# Patient Record
Sex: FEMALE | Race: BLACK OR AFRICAN AMERICAN
[De-identification: names, ages, dates, MRNs, and addresses within clinical notes are randomized per-mention and may not be internally consistent; named-entity substitution may affect disease eponyms.]

---

## 2019-08-29 ENCOUNTER — HOSPITAL ENCOUNTER (OUTPATIENT)
Dept: HOSPITAL 35 - CAT | Age: 74
End: 2019-08-29
Attending: RADIOLOGY
Payer: COMMERCIAL

## 2019-08-29 DIAGNOSIS — I71.2: ICD-10-CM

## 2019-08-29 DIAGNOSIS — Z95.828: ICD-10-CM

## 2019-08-29 DIAGNOSIS — I71.4: Primary | ICD-10-CM

## 2019-08-29 LAB
BUN SERPL-MCNC: 16 MG/DL (ref 7–18)
CREAT SERPL-MCNC: 1 MG/DL (ref 0.6–1)

## 2019-09-04 ENCOUNTER — HOSPITAL ENCOUNTER (OUTPATIENT)
Dept: HOSPITAL 35 - CAT | Age: 74
End: 2019-09-04
Attending: RADIOLOGY
Payer: COMMERCIAL

## 2019-09-04 DIAGNOSIS — Z85.828: ICD-10-CM

## 2019-09-04 DIAGNOSIS — I25.10: ICD-10-CM

## 2019-09-04 DIAGNOSIS — I71.4: Primary | ICD-10-CM

## 2019-09-04 DIAGNOSIS — M47.816: ICD-10-CM

## 2019-09-04 DIAGNOSIS — J98.11: ICD-10-CM

## 2019-09-04 DIAGNOSIS — I71.2: ICD-10-CM

## 2019-09-04 DIAGNOSIS — M47.814: ICD-10-CM

## 2019-09-04 DIAGNOSIS — N28.9: ICD-10-CM

## 2020-09-15 ENCOUNTER — HOSPITAL ENCOUNTER (OUTPATIENT)
Dept: HOSPITAL 35 - SJCVCIMAG | Age: 75
End: 2020-09-15
Attending: RADIOLOGY
Payer: COMMERCIAL

## 2020-09-15 DIAGNOSIS — I65.23: Primary | ICD-10-CM

## 2020-09-15 DIAGNOSIS — I73.9: ICD-10-CM

## 2020-09-15 DIAGNOSIS — E78.00: ICD-10-CM

## 2020-09-15 DIAGNOSIS — Z79.899: ICD-10-CM

## 2020-09-15 DIAGNOSIS — I25.10: ICD-10-CM

## 2020-09-15 DIAGNOSIS — Z85.828: ICD-10-CM

## 2020-09-15 DIAGNOSIS — I71.2: ICD-10-CM

## 2020-09-15 DIAGNOSIS — I10: ICD-10-CM

## 2020-09-15 DIAGNOSIS — J44.9: ICD-10-CM

## 2020-09-15 DIAGNOSIS — F17.200: ICD-10-CM

## 2020-09-22 ENCOUNTER — HOSPITAL ENCOUNTER (OUTPATIENT)
Dept: HOSPITAL 35 - CAT | Age: 75
Discharge: HOME | End: 2020-09-22
Attending: RADIOLOGY
Payer: COMMERCIAL

## 2020-09-22 DIAGNOSIS — Z45.2: Primary | ICD-10-CM

## 2020-09-22 DIAGNOSIS — Z79.899: ICD-10-CM

## 2020-09-22 DIAGNOSIS — Z98.890: ICD-10-CM

## 2020-09-22 DIAGNOSIS — Z88.8: ICD-10-CM

## 2020-09-22 DIAGNOSIS — I71.4: ICD-10-CM

## 2020-09-22 DIAGNOSIS — I71.2: ICD-10-CM

## 2020-09-22 LAB
ALBUMIN SERPL-MCNC: 2.9 G/DL (ref 3.4–5)
ALT SERPL-CCNC: 15 U/L (ref 30–65)
ANION GAP SERPL CALC-SCNC: 7 MMOL/L (ref 7–16)
AST SERPL-CCNC: 12 U/L (ref 15–37)
BILIRUB SERPL-MCNC: 0.4 MG/DL (ref 0.2–1)
BUN SERPL-MCNC: 13 MG/DL (ref 7–18)
CALCIUM SERPL-MCNC: 9.3 MG/DL (ref 8.5–10.1)
CHLORIDE SERPL-SCNC: 106 MMOL/L (ref 98–107)
CHOLEST SERPL-MCNC: 143 MG/DL (ref ?–200)
CO2 SERPL-SCNC: 31 MMOL/L (ref 21–32)
CREAT SERPL-MCNC: 1 MG/DL (ref 0.6–1)
GLUCOSE SERPL-MCNC: 93 MG/DL (ref 74–106)
HDLC SERPL-MCNC: 45 MG/DL (ref 40–?)
LDLC SERPL-MCNC: 77 MG/DL (ref ?–100)
POTASSIUM SERPL-SCNC: 3.7 MMOL/L (ref 3.5–5.1)
PROT SERPL-MCNC: 7.1 G/DL (ref 6.4–8.2)
SODIUM SERPL-SCNC: 144 MMOL/L (ref 136–145)
TC:HDL: 3.2 RATIO
TRIGL SERPL-MCNC: 107 MG/DL (ref ?–150)
VLDLC SERPL CALC-MCNC: 21 MG/DL (ref ?–40)

## 2021-03-24 ENCOUNTER — HOSPITAL ENCOUNTER (OUTPATIENT)
Dept: HOSPITAL 35 - SJCVCIMAG | Age: 76
End: 2021-03-24
Attending: INTERNAL MEDICINE
Payer: COMMERCIAL

## 2021-03-24 DIAGNOSIS — I35.1: Primary | ICD-10-CM

## 2021-03-24 DIAGNOSIS — E78.00: ICD-10-CM

## 2021-03-24 DIAGNOSIS — E78.5: ICD-10-CM

## 2021-03-24 DIAGNOSIS — I25.10: ICD-10-CM

## 2021-03-24 DIAGNOSIS — I11.9: ICD-10-CM

## 2021-03-24 DIAGNOSIS — R60.0: ICD-10-CM

## 2021-03-24 DIAGNOSIS — Z88.1: ICD-10-CM

## 2021-03-24 DIAGNOSIS — M19.90: ICD-10-CM

## 2021-03-24 DIAGNOSIS — J44.9: ICD-10-CM

## 2021-03-24 DIAGNOSIS — Z79.82: ICD-10-CM

## 2021-03-24 DIAGNOSIS — Z88.0: ICD-10-CM

## 2021-03-24 DIAGNOSIS — Z88.5: ICD-10-CM

## 2021-03-24 DIAGNOSIS — I71.4: ICD-10-CM

## 2021-03-24 DIAGNOSIS — I73.9: ICD-10-CM

## 2021-03-24 DIAGNOSIS — R06.00: ICD-10-CM

## 2021-03-24 DIAGNOSIS — Z79.899: ICD-10-CM

## 2021-03-24 DIAGNOSIS — F17.200: ICD-10-CM

## 2021-04-21 ENCOUNTER — HOSPITAL ENCOUNTER (OUTPATIENT)
Dept: HOSPITAL 35 - SJCVC | Age: 76
End: 2021-04-21
Attending: INTERNAL MEDICINE
Payer: COMMERCIAL

## 2021-04-21 DIAGNOSIS — Z95.828: ICD-10-CM

## 2021-04-21 DIAGNOSIS — Z98.61: ICD-10-CM

## 2021-04-21 DIAGNOSIS — F17.200: ICD-10-CM

## 2021-04-21 DIAGNOSIS — Z88.0: ICD-10-CM

## 2021-04-21 DIAGNOSIS — Z88.1: ICD-10-CM

## 2021-04-21 DIAGNOSIS — Z79.899: ICD-10-CM

## 2021-04-21 DIAGNOSIS — Z88.5: ICD-10-CM

## 2021-04-21 DIAGNOSIS — E78.00: ICD-10-CM

## 2021-04-21 DIAGNOSIS — I25.10: ICD-10-CM

## 2021-04-21 DIAGNOSIS — E78.5: ICD-10-CM

## 2021-04-21 DIAGNOSIS — J44.9: ICD-10-CM

## 2021-04-21 DIAGNOSIS — Z88.8: ICD-10-CM

## 2021-04-21 DIAGNOSIS — Z79.01: ICD-10-CM

## 2021-04-21 DIAGNOSIS — I73.9: ICD-10-CM

## 2021-04-21 DIAGNOSIS — I10: Primary | ICD-10-CM

## 2021-05-10 ENCOUNTER — HOSPITAL ENCOUNTER (INPATIENT)
Dept: HOSPITAL 35 - ER | Age: 76
LOS: 4 days | Discharge: HOME HEALTH | DRG: 189 | End: 2021-05-14
Attending: INTERNAL MEDICINE | Admitting: INTERNAL MEDICINE
Payer: COMMERCIAL

## 2021-05-10 VITALS — SYSTOLIC BLOOD PRESSURE: 148 MMHG | DIASTOLIC BLOOD PRESSURE: 86 MMHG

## 2021-05-10 VITALS — SYSTOLIC BLOOD PRESSURE: 153 MMHG | DIASTOLIC BLOOD PRESSURE: 88 MMHG

## 2021-05-10 VITALS — DIASTOLIC BLOOD PRESSURE: 70 MMHG | SYSTOLIC BLOOD PRESSURE: 128 MMHG

## 2021-05-10 VITALS — DIASTOLIC BLOOD PRESSURE: 52 MMHG | SYSTOLIC BLOOD PRESSURE: 122 MMHG

## 2021-05-10 VITALS — WEIGHT: 188 LBS | HEIGHT: 62.99 IN | BODY MASS INDEX: 33.31 KG/M2

## 2021-05-10 VITALS — SYSTOLIC BLOOD PRESSURE: 127 MMHG | DIASTOLIC BLOOD PRESSURE: 63 MMHG

## 2021-05-10 VITALS — DIASTOLIC BLOOD PRESSURE: 86 MMHG | SYSTOLIC BLOOD PRESSURE: 148 MMHG

## 2021-05-10 VITALS — SYSTOLIC BLOOD PRESSURE: 119 MMHG | DIASTOLIC BLOOD PRESSURE: 88 MMHG

## 2021-05-10 VITALS — SYSTOLIC BLOOD PRESSURE: 122 MMHG | DIASTOLIC BLOOD PRESSURE: 52 MMHG

## 2021-05-10 DIAGNOSIS — I48.91: ICD-10-CM

## 2021-05-10 DIAGNOSIS — I31.3: ICD-10-CM

## 2021-05-10 DIAGNOSIS — R62.7: ICD-10-CM

## 2021-05-10 DIAGNOSIS — Z90.49: ICD-10-CM

## 2021-05-10 DIAGNOSIS — Z92.21: ICD-10-CM

## 2021-05-10 DIAGNOSIS — Z82.49: ICD-10-CM

## 2021-05-10 DIAGNOSIS — J96.01: Primary | ICD-10-CM

## 2021-05-10 DIAGNOSIS — Z88.8: ICD-10-CM

## 2021-05-10 DIAGNOSIS — J91.8: ICD-10-CM

## 2021-05-10 DIAGNOSIS — J44.9: ICD-10-CM

## 2021-05-10 DIAGNOSIS — I73.9: ICD-10-CM

## 2021-05-10 DIAGNOSIS — E87.6: ICD-10-CM

## 2021-05-10 DIAGNOSIS — I10: ICD-10-CM

## 2021-05-10 DIAGNOSIS — Z85.038: ICD-10-CM

## 2021-05-10 DIAGNOSIS — I25.10: ICD-10-CM

## 2021-05-10 DIAGNOSIS — D50.9: ICD-10-CM

## 2021-05-10 DIAGNOSIS — Z95.820: ICD-10-CM

## 2021-05-10 DIAGNOSIS — E43: ICD-10-CM

## 2021-05-10 DIAGNOSIS — Z88.6: ICD-10-CM

## 2021-05-10 DIAGNOSIS — R53.81: ICD-10-CM

## 2021-05-10 DIAGNOSIS — Z95.5: ICD-10-CM

## 2021-05-10 DIAGNOSIS — Z92.3: ICD-10-CM

## 2021-05-10 DIAGNOSIS — E78.5: ICD-10-CM

## 2021-05-10 DIAGNOSIS — I71.4: ICD-10-CM

## 2021-05-10 DIAGNOSIS — Z88.0: ICD-10-CM

## 2021-05-10 DIAGNOSIS — Z87.891: ICD-10-CM

## 2021-05-10 DIAGNOSIS — K59.00: ICD-10-CM

## 2021-05-10 DIAGNOSIS — Z88.2: ICD-10-CM

## 2021-05-10 LAB
ALBUMIN SERPL-MCNC: 1.7 G/DL (ref 3.4–5)
ALT SERPL-CCNC: 16 U/L (ref 14–59)
ANION GAP SERPL CALC-SCNC: 13 MMOL/L (ref 7–16)
APTT BLD: 26.3 SECONDS (ref 24.5–32.8)
AST SERPL-CCNC: 15 U/L (ref 15–37)
BASOPHILS NFR BLD AUTO: 0.5 % (ref 0–2)
BE(VIVO): -2.5 MMOL/L
BF MACROPHAGE: 3 %
BF NEUTROPHILS: 43 %
BF NUCLEATED CELLS: 1484 /MM3
BF RBC: 1389 /MM3
BILIRUB DIRECT SERPL-MCNC: 0.1 MG/DL
BILIRUB SERPL-MCNC: 0.6 MG/DL (ref 0.2–1)
BUN SERPL-MCNC: 12 MG/DL (ref 7–18)
CALCIUM SERPL-MCNC: 8.8 MG/DL (ref 8.5–10.1)
CHLORIDE SERPL-SCNC: 110 MMOL/L (ref 98–107)
CLARITY UR: (no result)
CO2 SERPL-SCNC: 22 MMOL/L (ref 21–32)
COLOR UR: YELLOW
CREAT SERPL-MCNC: 1.1 MG/DL (ref 0.6–1)
EOSINOPHIL NFR BLD: 0.2 % (ref 0–3)
ERYTHROCYTE [DISTWIDTH] IN BLOOD BY AUTOMATED COUNT: 20.9 % (ref 10.5–14.5)
FERRITIN SERPL-MCNC: 222 NG/ML (ref 8–252)
FOLATE SERPL-MCNC: 3.7 NG/ML (ref 8.6–58.9)
GLUCOSE SERPL-MCNC: 190 MG/DL (ref 74–106)
GRANULOCYTES NFR BLD MANUAL: 78.1 % (ref 36–66)
HCO3 BLD-SCNC: 20.2 MMOL/L (ref 22–26)
HCT VFR BLD CALC: 27.5 % (ref 37–47)
HGB BLD-MCNC: 8.8 GM/DL (ref 12–15)
INR PPP: 1.11
IRON SERPL-MCNC: 13 UG/DL (ref 50–170)
LYMPHOCYTES NFR BLD AUTO: 11.6 % (ref 24–44)
MCH RBC QN AUTO: 25.2 PG (ref 26–34)
MCHC RBC AUTO-ENTMCNC: 32 G/DL (ref 28–37)
MCV RBC: 78.8 FL (ref 80–100)
MONOCYTES NFR BLD: 9.6 % (ref 1–8)
NEUTROPHILS # BLD: 8.3 THOU/UL (ref 1.4–8.2)
PCO2 BLD: 27.6 MMHG (ref 35–45)
PLATELET # BLD: 435 THOU/UL (ref 150–400)
PO2 BLD: 65.9 MMHG (ref 80–100)
POTASSIUM SERPL-SCNC: 4.2 MMOL/L (ref 3.5–5.1)
PROT SERPL-MCNC: 6.1 G/DL (ref 6.4–8.2)
PROTHROMBIN TIME: 12 SECONDS (ref 10.5–12.1)
RBC # BLD AUTO: 3.49 MIL/UL (ref 4.2–5)
SAO2 % BLD FROM PO2: 8 % (ref 20–39)
SODIUM SERPL-SCNC: 145 MMOL/L (ref 136–145)
SOURCE: (no result)
SPECIMEN VOL 24H UR: 60 ML
TIBC SERPL-MCNC: 163 UG/DL (ref 250–450)
TROPONIN I SERPL-MCNC: <0.06 NG/ML (ref ?–0.06)
VIT B12 SERPL-MCNC: 1178 PG/ML (ref 193–986)
WBC # BLD AUTO: 10.6 THOU/UL (ref 4–11)

## 2021-05-10 PROCEDURE — 0W9B3ZZ DRAINAGE OF LEFT PLEURAL CAVITY, PERCUTANEOUS APPROACH: ICD-10-PCS | Performed by: RADIOLOGY

## 2021-05-10 PROCEDURE — 10081 I&D PILONIDAL CYST COMP: CPT

## 2021-05-10 NOTE — EKG
[address]  [address]
Phone:  [phone]                    ELECTROCARDIOGRAM REPORT      
_______________________________________________________________________________
 
Name:       ISIS LWO          Room #:         170-1       ADM IN  
M.R.#:      [id number]     Account #:      [id number]  
Admission:  05/10/21    Attend Phys:    Eduardo Cordova MD      
Discharge:              Date of Birth:  [birth year]  
                                                          Report #: [id number]
   [id number]
_______________________________________________________________________________
                         [facility] ED
                                       
Test Date:    2021-05-10               Test Time:    04:36:29
Pat Name:     ISIS LOW       Department:   
Patient ID:   [id number]            Room:         170
Gender:       F                        Technician:   JCJOSTIN
:          1945               Requested By: Tonia Toro
Order Number: [id number] MD:   Lazaro Cook
                                 Measurements
Intervals                              Axis          
Rate:         107                      P:            19
DE:           154                      QRS:          6
QRSD:         81                       T:            4
QT:           322                                    
QTc:          430                                    
                           Interpretive Statements
Sinus tachycardia
Sinus pause
Compared to ECG 2019 10:08:30
Sinus pause or arrest now present
Early repolarization now present
Possible ischemia now present
Sinus rhythm no longer present
T-wave abnormality no longer present
Electronically Signed On 5- 7:53:01 CDT by Lazaro Cook
[email or web address]
 
 
 
 
 
 
 
 
 
 
 
 
 
 
 
 
 
  <ELECTRONICALLY SIGNED>
   By: Lazaro Cook MD, FACC    
  05/10/21     0753
D: 05/10/21 0436                           _____________________________________
T: 05/10/21 0436                           Lazaro Cook MD, [facility]      /EPI

## 2021-05-10 NOTE — NUR
PT CARE ASSUMED AT 1100. ASSESSMENTS AS CHARTED. MEDICATIONS AS CHARTED. LAC
IV. LCHEST IV. SINUS RHYTHM.  BSC-OCCULT STOOL NEEDED. US FOR LT
THORACENTESIS, 700 ML OFF FLUID REMOVED. AWAITING MED ORDERS.

## 2021-05-11 VITALS — SYSTOLIC BLOOD PRESSURE: 120 MMHG | DIASTOLIC BLOOD PRESSURE: 68 MMHG

## 2021-05-11 VITALS — DIASTOLIC BLOOD PRESSURE: 68 MMHG | SYSTOLIC BLOOD PRESSURE: 120 MMHG

## 2021-05-11 VITALS — DIASTOLIC BLOOD PRESSURE: 66 MMHG | SYSTOLIC BLOOD PRESSURE: 119 MMHG

## 2021-05-11 VITALS — SYSTOLIC BLOOD PRESSURE: 102 MMHG | DIASTOLIC BLOOD PRESSURE: 71 MMHG

## 2021-05-11 VITALS — SYSTOLIC BLOOD PRESSURE: 135 MMHG | DIASTOLIC BLOOD PRESSURE: 80 MMHG

## 2021-05-11 VITALS — DIASTOLIC BLOOD PRESSURE: 65 MMHG | SYSTOLIC BLOOD PRESSURE: 138 MMHG

## 2021-05-11 VITALS — DIASTOLIC BLOOD PRESSURE: 82 MMHG | SYSTOLIC BLOOD PRESSURE: 141 MMHG

## 2021-05-11 VITALS — SYSTOLIC BLOOD PRESSURE: 138 MMHG | DIASTOLIC BLOOD PRESSURE: 65 MMHG

## 2021-05-11 LAB
ALBUMIN SERPL-MCNC: 1.5 G/DL (ref 3.4–5)
ALT SERPL-CCNC: 14 U/L (ref 30–65)
ANION GAP SERPL CALC-SCNC: 11 MMOL/L (ref 7–16)
AST SERPL-CCNC: 11 U/L (ref 15–37)
BE(VIVO): 0.2 MMOL/L
BILIRUB SERPL-MCNC: 0.6 MG/DL (ref 0.2–1)
BUN SERPL-MCNC: 12 MG/DL (ref 7–18)
CALCIUM SERPL-MCNC: 8.7 MG/DL (ref 8.5–10.1)
CHLORIDE SERPL-SCNC: 108 MMOL/L (ref 98–107)
CO2 SERPL-SCNC: 24 MMOL/L (ref 21–32)
CREAT SERPL-MCNC: 1.1 MG/DL (ref 0.6–1)
ERYTHROCYTE [DISTWIDTH] IN BLOOD BY AUTOMATED COUNT: 20.3 % (ref 10.5–14.5)
GLUCOSE SERPL-MCNC: 122 MG/DL (ref 74–106)
HCO3 BLD-SCNC: 23.7 MMOL/L (ref 22–26)
HCT VFR BLD CALC: 25.8 % (ref 37–47)
HGB BLD-MCNC: 8.2 GM/DL (ref 12–15)
MAGNESIUM SERPL-MCNC: 2 MG/DL (ref 1.8–2.4)
MCH RBC QN AUTO: 24.7 PG (ref 26–34)
MCHC RBC AUTO-ENTMCNC: 31.7 G/DL (ref 28–37)
MCV RBC: 78 FL (ref 80–100)
PCO2 BLD: 33.9 MMHG (ref 35–45)
PLATELET # BLD: 364 THOU/UL (ref 150–400)
PO2 BLD: 71.8 MMHG (ref 80–100)
POTASSIUM SERPL-SCNC: 3.4 MMOL/L (ref 3.5–5.1)
PROT SERPL-MCNC: 6.2 G/DL (ref 6.4–8.2)
RBC # BLD AUTO: 3.31 MIL/UL (ref 4.2–5)
SODIUM SERPL-SCNC: 143 MMOL/L (ref 136–145)
WBC # BLD AUTO: 10.5 THOU/UL (ref 4–11)

## 2021-05-11 NOTE — NUR
RD consult received for malnutrition.  Admit with bilateral pleural effusions
s/p thoracentesis with 700ml fluid removed.  pt with new dx diabetes which she
states was at previous hospital she was at.  Went home and stated did not know
how to administer insulin.  Very somulent and did not provide much other
information other than "not hungry".  Was able to obtain some lunch
preferences for today.  Folate levels depleted 3.7.  Unsure of wt loss.  Will
start glucerna shakes bid and address any further nutrition education needs
at more appropriate time.  Physician has indicated protein calorie
malnutrition: RD defer until more information available.

## 2021-05-11 NOTE — NUR
PT CARE ASSUMED AT 0700. ASSESSMENTS AS CHARTED. MEDICATIONS AS CHARTED. LAC
IV. LT CHEST IV. SINUS TACHYCARDIA. BSC, OCCULT STOOL NEEDED. FREQUENT
URINATION, LASIX GIVEN. DAY TWO; LT THORACENTESIS, 700 ML. ACHS. HYDROCODONE
Q6PRN, PT IN PAIN IN LT FLANK, 10/10. HR INCREASES WHEN PT USES BSC.

## 2021-05-11 NOTE — NUR
Medicated for pain with good relief. Left side thoracentesis site with bandaid
clean,dry and intact. Maintaining O2 sat in the upper 90's on 2L/NC. She
verbalized being short of breath with exertion. ST with activities and SR at
rest. Bed alarm on and SCD's in place.

## 2021-05-11 NOTE — NUR
INITIAL ASSESSMENT:
SW reviewed chart and spoke with nursing and attending physician. Pt was
admitted from home due to dyspnea. Pt had thoracentesis yesterday per pulm. Pt
is on IV lasix. Discharge is anticipated in 1-2 days. SW met with pt at
bedside. Introduced role of SW. Pt is alert/orientated x 4. Pt reports she
lives at home with her . Prior to admission, pt was independent with
ADLs. Pt states she has a cane to use if needed. Pt lives in a one-level
house. No hx of HH or post-acute placement. Pt's PCP is Dr. Lavinia Ann in
[place]. Pt states she was recently at [facility]. SW discussed
HH or post-acute placement if recommended. Pt states she will not need
anything when discharged. SW is following to assist as needed with discharge
planning.

## 2021-05-11 NOTE — 2DMMODE
[facility]
[place]   [address]                   2 D/M-MODE ECHOCARDIOGRAM     
_______________________________________________________________________________
 
Name:       ISIS LOW          Room #:         209-P       ADM IN  
M.R.#:      [id number]                       Account #:      [id number]  
Admission:  05/10/21    Attend Phys:    Eduardo Cordova MD      
Discharge:              Date of Birth:  [birth year]  
                                                          Report #: [id number]
                                                                    [id number]
_______________________________________________________________________________
THIS REPORT FOR:  
 
cc:  Lavinia Ann MD, Ghazal A. MD Park, Jin S. MD                                                   
                                                                       ~
 
--------------- APPROVED REPORT --------------
 
 
Study performed:  2021 13:40:42
 
EXAM: Comprehensive 2D, Doppler, and color-flow 
Echocardiogram 
Patient Location: Bedside   
Room #:  209     Status:  routine
 
       BSA:         1.85
HR: 107 bpm  BP:          120/68 mmHg 
Rhythm: Tachycardia     
 
Other Information 
Study Quality: Adequate
 
Indications
Respiratory failure.
Hx: CAD, stents, Afib, COPD, PVD, HTN, HLP.
 
2D Dimensions
RVDd:  28.12 mm  
IVSd:  12.20 (7-11mm) LVOT Diam:  20.61 (18-24mm) 
LVDd:  38.77 mm  
PWd:  12.29 (7-11mm) Ascending Ao:  32.87 (22-36mm)
LVDs:  28.14 (25-40mm) 
Aortic Root:  33.83 mm 
 
Volumes
Left Atrial Volume (Systole) 
Single Plane 4CH:  26.21 mL Single Plane 2CH:  33.57 mL
    LA ESV Index:  17.00 mL/m2
 
Aortic Valve
AoV Peak Lai.:  1.81 m/s 
AO Peak Gr.:  13.10 mmHg LVOT Max P.60 mmHg
    LVOT Max V:  1.07 m/s
LUDIN Vmax: 1.98 cm2  
 
 
[facility]
[address]
[place]  [address]
Phone:  [phone]                    2 D/M-MODE ECHOCARDIOGRAM     
_______________________________________________________________________________
 
Name:            ISIS LOW          Room #:        209-[facility] [place]
[facility].#:           [id number]          Account #:     [id number]  
Admission:       05/10/21         Attend Phys:   Eduardo Cordova MD  
Discharge:                  Date of Birth: [birth year]  
                         Report #:      [id number]
        [id number]
_______________________________________________________________________________
 
Pulmonary Valve
PV Peak Lai.:  1.01 m/s PV Peak Gr.:  4.06 mmHg
 
Tricuspid Valve
 RAP Estimate:  5.00 mmHg
 
Left Ventricle
The left ventricle is normal size. There is normal LV segmental wall 
motion. Mild concentric left ventricular hypertrophy. Left 
ventricular systolic function is normal. LVEF is 60-65%. This study 
is not technically sufficient to allow evaluation of the LV diastolic 
function.
 
Right Ventricle
The right ventricle is normal size. The right ventricular systolic 
function is normal.
 
Atria
The left atrium size is normal. The right atrium size is 
normal.
 
Aortic Valve
The aortic valve is normal in structure; calcified. Mild aortic 
regurgitation. There is no aortic valvular stenosis.
 
Mitral Valve
The mitral valve is normal in structure. Mild mitral annular 
calcification. There is no mitral valve regurgitation noted. No 
evidence of mitral valve stenosis.
 
Tricuspid Valve
The tricuspid valve is normal in structure. Trace tricuspid 
regurgitation. Unable to assess PA pressure.
 
Pulmonic Valve
The pulmonary valve is normal in structure. There is no pulmonic 
valvular regurgitation.
 
Great Vessels
The aortic root is normal in size. The ascending aorta is normal in 
size. IVC is normal in size and collapses >50% with 
inspiration.
 
Pericardium
Small pericardial effusion.
 
 
[facility]
[address]
[place]  [address]
Phone:  [phone]                    2 D/M-MODE ECHOCARDIOGRAM     
_______________________________________________________________________________
 
Name:            ISIS LOW          Room #:        209-P       [facility] [place]
M.R.#:           [id number]          Account #:     [id number]  
Admission:       05/10/21         Attend Phys:   Eduardo Cordova MD  
Discharge:                  Date of Birth: [birth year]  
                         Report #:      [id number]
        [id number]
_______________________________________________________________________________
 
<Conclusion>
The left ventricle is normal size.
Mild concentric left ventricular hypertrophy.
Left ventricular systolic function is normal.
The right ventricle is normal size.
The left atrium size is normal.
Mild aortic regurgitation.
Mild mitral annular calcification.
There is no mitral valve regurgitation noted.
 
 
 
 
 
 
 
 
 
 
 
 
 
 
 
 
 
 
 
 
 
 
 
 
 
 
 
 
 
 
 
 
 
 
  <ELECTRONICALLY SIGNED>
   By: Corey Nolan MD               
  21     1509
D: 21 1509                           _____________________________________
T: 21 1509                           Corey Nolan MD                 /INF

## 2021-05-12 VITALS — SYSTOLIC BLOOD PRESSURE: 104 MMHG | DIASTOLIC BLOOD PRESSURE: 51 MMHG

## 2021-05-12 VITALS — SYSTOLIC BLOOD PRESSURE: 108 MMHG | DIASTOLIC BLOOD PRESSURE: 50 MMHG

## 2021-05-12 VITALS — SYSTOLIC BLOOD PRESSURE: 97 MMHG | DIASTOLIC BLOOD PRESSURE: 53 MMHG

## 2021-05-12 VITALS — SYSTOLIC BLOOD PRESSURE: 139 MMHG | DIASTOLIC BLOOD PRESSURE: 63 MMHG

## 2021-05-12 VITALS — DIASTOLIC BLOOD PRESSURE: 60 MMHG | SYSTOLIC BLOOD PRESSURE: 115 MMHG

## 2021-05-12 LAB
ANION GAP SERPL CALC-SCNC: 8 MMOL/L (ref 7–16)
BUN SERPL-MCNC: 16 MG/DL (ref 7–18)
CALCIUM SERPL-MCNC: 8.6 MG/DL (ref 8.5–10.1)
CHLORIDE SERPL-SCNC: 108 MMOL/L (ref 98–107)
CO2 SERPL-SCNC: 27 MMOL/L (ref 21–32)
CREAT SERPL-MCNC: 1.3 MG/DL (ref 0.6–1)
ERYTHROCYTE [DISTWIDTH] IN BLOOD BY AUTOMATED COUNT: 20.4 % (ref 10.5–14.5)
GLUCOSE SERPL-MCNC: 145 MG/DL (ref 74–106)
HCT VFR BLD CALC: 28.5 % (ref 37–47)
HGB BLD-MCNC: 9 GM/DL (ref 12–15)
MCH RBC QN AUTO: 24.8 PG (ref 26–34)
MCHC RBC AUTO-ENTMCNC: 31.8 G/DL (ref 28–37)
MCV RBC: 78 FL (ref 80–100)
PLATELET # BLD: 373 THOU/UL (ref 150–400)
POTASSIUM SERPL-SCNC: 4.1 MMOL/L (ref 3.5–5.1)
RBC # BLD AUTO: 3.65 MIL/UL (ref 4.2–5)
SODIUM SERPL-SCNC: 143 MMOL/L (ref 136–145)
WBC # BLD AUTO: 7.7 THOU/UL (ref 4–11)

## 2021-05-12 NOTE — NUR
ASSUMED CARE SHIFT CHANGE. ASSESSMENT AS CHARTED.MEDS. VSS. C/O PAIN MANAGED
WITH PO PAIN MEDS. PT UP TO BSC SETH WELL. APPETITE LESS THAN ADEQUATE. PHYS
THERAPY ATTEMPT TO WORK WITH PT, REFUSED. EDUCATED ON IMPORTANCE OF ACTIVITY
TO GET BETTER, PT STILL REFUSED. PT SEEMS TO HAVE LITTLE MOTIVATION. C/O
CONSTIPATION PHYSICIAN NOTIFED, ORDERS RECEIVED. CONTINUING POC. WILL PASS
REPORT TO NOC RN.

## 2021-05-12 NOTE — NUR
ASSESSMENTS AS CHARTED, MEDS CHARTED AS GIVEN. PATIENT RESTING IN BED DURING
SHIFT. PATIENT IS SINUS TACH WITH PAC'S OR AFIB ON TELEMETRY DURING SHIFT.
PATIENT ON 2 LITERS NC DURING SHIFT. PATIENT DID NOT REQUIRE COVERAGE ON ACCU
CHECK AT HS. DENIED PAIN DURING SHIFT. PATIENT BREATHING EASIER POST
THORACENTESIS. PATIENT HAD RUN OF SINUS TACH AROUND 200 BPM WHILE RESTING IN
BED. FALL PRECAUTIONS IN PLACE DURING SHIFT.

## 2021-05-12 NOTE — NUR
therapy evals in process who recommend HH at [place]. Sp with patient she continues
to have oxygen on during hosp stay and she reports she does not use at home.
Discussed hh with patient she reports she has used in past but cannot recall
agency. Patient has no prefernce for home health care. Faxed referrel form to
[facility]. Requested liason with [facility] see patient in am. Verified address,
phone numner and PCP on face sheet. [facility] following.

## 2021-05-13 VITALS — SYSTOLIC BLOOD PRESSURE: 130 MMHG | DIASTOLIC BLOOD PRESSURE: 69 MMHG

## 2021-05-13 VITALS — SYSTOLIC BLOOD PRESSURE: 132 MMHG | DIASTOLIC BLOOD PRESSURE: 64 MMHG

## 2021-05-13 VITALS — DIASTOLIC BLOOD PRESSURE: 48 MMHG | SYSTOLIC BLOOD PRESSURE: 113 MMHG

## 2021-05-13 VITALS — SYSTOLIC BLOOD PRESSURE: 117 MMHG | DIASTOLIC BLOOD PRESSURE: 50 MMHG

## 2021-05-13 VITALS — SYSTOLIC BLOOD PRESSURE: 106 MMHG | DIASTOLIC BLOOD PRESSURE: 48 MMHG

## 2021-05-13 LAB
ALBUMIN FLD-MCNC: 1.2 G/DL
AMYLASE FLD-CCNC: 5 U/L
BODY FLUID LDH: 240 IU/L
CREAT FLD-MCNC: 0.9 MG/DL
GLUCOSE FLD-MCNC: 171 MG/DL
PROT FLD-MCNC: 2.6 G/DL
SOURCE: (no result)

## 2021-05-13 NOTE — NUR
7760 NO COMPLAINTS OF PAIN THIS SHIFT. UP TO [facility] AS NEEDED. REPORT GIVEN TO
DIXIE KING.

## 2021-05-13 NOTE — NUR
ASSESSMENT AS CHARTED -  MEDS AS PER MAR -  ACCUCHECKS AS CHARTED - COVERED
PER SSI.  SETH DIET AND FLUIDS.  UP TO THE [facility] WITH STANDBY ASSIST -  STOOL
SNET TO THE LAB  - NEG HEAM.  LASIX CHANGED TO DEMEDEX.  PT HAD EXERCISE
OXIMETERY COMPLETED TODAY -  DOES NOT REQUIRE 02.  PT SEEN BY HOME HEALTH RN
THIS AM - PT STATED THAT SHE WOULD ALLOW HOME HEALTH TO SEE HER.  CONSULT TO
GERIATRICIAN.  IN TO SEE PATIENT.  PT NOT VERY TALKATIVE AT TIME - CAN
BE RAATHER VAGUE IN ANSWERS TO QUESTIONS.  APPEARS TO BE RESTING AT THE
PRESENT TIME.  NO CO'S.

## 2021-05-13 NOTE — NUR
PATIENTS CARE WAS ASSUMED AT 2300. PATIENT WAS ASSESSED AND MEDS WERE PASSED.
PATIENT CONTINUES TO BE ON 2L NC. PATIENT CONTINUES TO BE SHORT OF AIR WITH
ACTIVITY. PATIENTS ONLY C/O NOT ENOUGH FOOD. PATIENT REQUESTING FOOD THIS
MORNING. PATIENT REQUESTED TWO ICE CREAMS. ROUNDS WERE DONE. PATIENT HAD A
RESTFUL NIGHT. THE BED IS IN A LOW AND LOCKED POSITION

## 2021-05-13 NOTE — PATH
[facility]
[address]
[place]   [address]                     PATHOLOGY RPT PROCEDURE       
_______________________________________________________________________________
 
Name:       ISIS LOW          Room #:         209-P       ADM IN  
..#:      [id number]     Account #:      [id number]  
Admission:  05/10/21    Date of Birth:  [birth year]  
Discharge:                                              Report #:    [id number]
                                                        Path Case #: [id number]
_______________________________________________________________________________
Note
[facility] Accession Number: [id number]
   TESTS               RESULT  FLAG  UNITS    REF RANGE  LAB
------------------------------------------------------------
   Clinician Provided Cytology Information
   No. of containers..01 Other (Miscellaneous)
Source:                                                   01
   PLEURAL FLUID
DIAGNOSIS:                                                02
   PLEURAL FLUID
   NEGATIVE FOR MALIGNANT CELLS.
   MESOTHELIAL CELLS ARE PRESENT.
   THIS INTERPRETATION INCLUDES EVALUATION OF A CELL BLOCK.
Signed out by:                                            02
   Spencer Wells Kerley, MD, Pathologist
   NPI- [id number]
Performed by:                                             01
   Amna Simons, Cytotechnologist ([facility])
Gross description:                                        01
   15ML, CLDY YELLOW, 1 TP
   /LCS  05/11/2021 2020 [facility]
 
------------------------------------------------------------
    FLAG LEGEND:
    L-Low Normal,H-High Normal,LL-Alert Low,HH-Alert High
    <-Panic Low,>-Panic High,A-Abnormal,AA-Critical Abnormal
------------------------------------------------------------
 
Performed at:
[address]
   [place]  [address]
   Jez Dillard MD, Phone: [phone]
[address] [place] [address]  [address]
   Spencer Kerley MD, Phone: [phone]
Specimen Comment: A courtesy copy of this report has been sent to [phone],
[phone]
Specimen Comment: [phone]
Specimen Comment: Report sent to  / DR MARTIN
***Performed at:  01
   [address], [place], [place]  [id number]
   MD Jez Dillard MD Phone:  [phone]

## 2021-05-14 VITALS — DIASTOLIC BLOOD PRESSURE: 64 MMHG | SYSTOLIC BLOOD PRESSURE: 137 MMHG

## 2021-05-14 VITALS — DIASTOLIC BLOOD PRESSURE: 69 MMHG | SYSTOLIC BLOOD PRESSURE: 137 MMHG

## 2021-05-14 LAB
ANION GAP SERPL CALC-SCNC: 7 MMOL/L (ref 7–16)
BUN SERPL-MCNC: 28 MG/DL (ref 7–18)
CALCIUM SERPL-MCNC: 8.6 MG/DL (ref 8.5–10.1)
CHLORIDE SERPL-SCNC: 105 MMOL/L (ref 98–107)
CO2 SERPL-SCNC: 30 MMOL/L (ref 21–32)
CREAT SERPL-MCNC: 1.3 MG/DL (ref 0.6–1)
GLUCOSE SERPL-MCNC: 141 MG/DL (ref 74–106)
POTASSIUM SERPL-SCNC: 4.3 MMOL/L (ref 3.5–5.1)
SODIUM SERPL-SCNC: 142 MMOL/L (ref 136–145)

## 2021-05-14 NOTE — NUR
Followup: eating better 50-70% of meals and receptive to glucerna shakes.  BG
levels very high 222-302 likely aggravated by steroids.  Basic nutrition
education provided for new dx diabetes however pt with minimal participation,
not showing interest at this time.  Keeps room darkened.

## 2021-05-14 NOTE — NUR
ASSUMED CARE SHIFT CHANGE. PT DENIED AM ASSESSMENT, VITALS AND BLOOD SUGAR.
STATES READY TO GO HOME.DENIES PAIN. PT REMOVED IV AND TELE. DC DISCUSSED WITH
PT COMMUNICATING UNDERSTANDING. PT LEFT UNIT WITH ALL BELONGINGS ACCOMPANIED
BY SPOUSE.

## 2021-05-14 NOTE — NUR
Pt dcing home today.  orders noted and the [facility] liason is here and
confirmed they can accept. No other needs noted.

## 2021-05-18 ENCOUNTER — HOSPITAL ENCOUNTER (INPATIENT)
Dept: HOSPITAL 35 - ER | Age: 76
LOS: 3 days | Discharge: HOME HEALTH | DRG: 377 | End: 2021-05-21
Attending: INTERNAL MEDICINE | Admitting: INTERNAL MEDICINE
Payer: COMMERCIAL

## 2021-05-18 VITALS — BODY MASS INDEX: 31.48 KG/M2 | WEIGHT: 177.7 LBS | HEIGHT: 63 IN

## 2021-05-18 VITALS — SYSTOLIC BLOOD PRESSURE: 90 MMHG | DIASTOLIC BLOOD PRESSURE: 66 MMHG

## 2021-05-18 VITALS — DIASTOLIC BLOOD PRESSURE: 62 MMHG | SYSTOLIC BLOOD PRESSURE: 101 MMHG

## 2021-05-18 VITALS — SYSTOLIC BLOOD PRESSURE: 91 MMHG | DIASTOLIC BLOOD PRESSURE: 52 MMHG

## 2021-05-18 DIAGNOSIS — E86.1: ICD-10-CM

## 2021-05-18 DIAGNOSIS — Z95.5: ICD-10-CM

## 2021-05-18 DIAGNOSIS — G89.29: ICD-10-CM

## 2021-05-18 DIAGNOSIS — Z92.21: ICD-10-CM

## 2021-05-18 DIAGNOSIS — I10: ICD-10-CM

## 2021-05-18 DIAGNOSIS — E43: ICD-10-CM

## 2021-05-18 DIAGNOSIS — E78.5: ICD-10-CM

## 2021-05-18 DIAGNOSIS — E86.0: ICD-10-CM

## 2021-05-18 DIAGNOSIS — I48.91: ICD-10-CM

## 2021-05-18 DIAGNOSIS — Z90.49: ICD-10-CM

## 2021-05-18 DIAGNOSIS — M54.9: ICD-10-CM

## 2021-05-18 DIAGNOSIS — R53.81: ICD-10-CM

## 2021-05-18 DIAGNOSIS — N17.0: ICD-10-CM

## 2021-05-18 DIAGNOSIS — Z88.8: ICD-10-CM

## 2021-05-18 DIAGNOSIS — Z87.891: ICD-10-CM

## 2021-05-18 DIAGNOSIS — I95.9: ICD-10-CM

## 2021-05-18 DIAGNOSIS — Z88.0: ICD-10-CM

## 2021-05-18 DIAGNOSIS — Z88.2: ICD-10-CM

## 2021-05-18 DIAGNOSIS — D64.9: ICD-10-CM

## 2021-05-18 DIAGNOSIS — E11.51: ICD-10-CM

## 2021-05-18 DIAGNOSIS — K92.2: Primary | ICD-10-CM

## 2021-05-18 DIAGNOSIS — Z85.038: ICD-10-CM

## 2021-05-18 DIAGNOSIS — I25.10: ICD-10-CM

## 2021-05-18 DIAGNOSIS — J44.9: ICD-10-CM

## 2021-05-18 DIAGNOSIS — Z88.6: ICD-10-CM

## 2021-05-18 DIAGNOSIS — Z92.3: ICD-10-CM

## 2021-05-18 LAB
ALBUMIN SERPL-MCNC: 2.5 G/DL (ref 3.4–5)
ALT SERPL-CCNC: 17 U/L (ref 14–59)
ANION GAP SERPL CALC-SCNC: 8 MMOL/L (ref 7–16)
AST SERPL-CCNC: 12 U/L (ref 15–37)
BASOPHILS NFR BLD AUTO: 1 % (ref 0–2)
BILIRUB SERPL-MCNC: 0.5 MG/DL (ref 0.2–1)
BILIRUB UR-MCNC: NEGATIVE MG/DL
BUN SERPL-MCNC: 32 MG/DL (ref 7–18)
CALCIUM SERPL-MCNC: 9.3 MG/DL (ref 8.5–10.1)
CHLORIDE SERPL-SCNC: 97 MMOL/L (ref 98–107)
CO2 SERPL-SCNC: 30 MMOL/L (ref 21–32)
COLOR UR: YELLOW
CREAT SERPL-MCNC: 2 MG/DL (ref 0.6–1)
EOSINOPHIL NFR BLD: 2.1 % (ref 0–3)
ERYTHROCYTE [DISTWIDTH] IN BLOOD BY AUTOMATED COUNT: 21.3 % (ref 10.5–14.5)
GLUCOSE SERPL-MCNC: 260 MG/DL (ref 74–106)
GRANULOCYTES NFR BLD MANUAL: 56 % (ref 36–66)
HCT VFR BLD CALC: 32.8 % (ref 37–47)
HGB BLD-MCNC: 10.8 GM/DL (ref 12–15)
KETONES UR STRIP-MCNC: NEGATIVE MG/DL
LYMPHOCYTES NFR BLD AUTO: 34.6 % (ref 24–44)
MCH RBC QN AUTO: 25.4 PG (ref 26–34)
MCHC RBC AUTO-ENTMCNC: 32.8 G/DL (ref 28–37)
MCV RBC: 77.4 FL (ref 80–100)
MONOCYTES NFR BLD: 6.3 % (ref 1–8)
NEUTROPHILS # BLD: 5 THOU/UL (ref 1.4–8.2)
PLATELET # BLD: 350 THOU/UL (ref 150–400)
POTASSIUM SERPL-SCNC: 3.5 MMOL/L (ref 3.5–5.1)
PROT SERPL-MCNC: 7.5 G/DL (ref 6.4–8.2)
RBC # BLD AUTO: 4.24 MIL/UL (ref 4.2–5)
RBC # UR STRIP: NEGATIVE /UL
SODIUM SERPL-SCNC: 135 MMOL/L (ref 136–145)
SP GR UR STRIP: 1.02 (ref 1–1.03)
TROPONIN I SERPL-MCNC: <0.06 NG/ML (ref ?–0.06)
URINE CLARITY: CLEAR
URINE GLUCOSE-RANDOM*: NEGATIVE
URINE LEUKOCYTES-REFLEX: NEGATIVE
URINE NITRITE-REFLEX: NEGATIVE
URINE PROTEIN (DIPSTICK): NEGATIVE
UROBILINOGEN UR STRIP-ACNC: 0.2 E.U./DL (ref 0.2–1)
VIT B12 SERPL-MCNC: 694 PG/ML (ref 193–986)
WBC # BLD AUTO: 8.9 THOU/UL (ref 4–11)

## 2021-05-18 PROCEDURE — 10879: CPT

## 2021-05-19 VITALS — SYSTOLIC BLOOD PRESSURE: 106 MMHG | DIASTOLIC BLOOD PRESSURE: 58 MMHG

## 2021-05-19 VITALS — SYSTOLIC BLOOD PRESSURE: 115 MMHG | DIASTOLIC BLOOD PRESSURE: 70 MMHG

## 2021-05-19 VITALS — SYSTOLIC BLOOD PRESSURE: 104 MMHG | DIASTOLIC BLOOD PRESSURE: 64 MMHG

## 2021-05-19 VITALS — SYSTOLIC BLOOD PRESSURE: 99 MMHG | DIASTOLIC BLOOD PRESSURE: 58 MMHG

## 2021-05-19 VITALS — SYSTOLIC BLOOD PRESSURE: 90 MMHG | DIASTOLIC BLOOD PRESSURE: 43 MMHG

## 2021-05-19 VITALS — DIASTOLIC BLOOD PRESSURE: 43 MMHG | SYSTOLIC BLOOD PRESSURE: 90 MMHG

## 2021-05-19 VITALS — DIASTOLIC BLOOD PRESSURE: 55 MMHG | SYSTOLIC BLOOD PRESSURE: 85 MMHG

## 2021-05-19 LAB
ANION GAP SERPL CALC-SCNC: 6 MMOL/L (ref 7–16)
BASOPHILS NFR BLD AUTO: 0.9 % (ref 0–2)
BUN SERPL-MCNC: 28 MG/DL (ref 7–18)
CALCIUM SERPL-MCNC: 8.4 MG/DL (ref 8.5–10.1)
CHLORIDE SERPL-SCNC: 106 MMOL/L (ref 98–107)
CO2 SERPL-SCNC: 27 MMOL/L (ref 21–32)
CREAT SERPL-MCNC: 1.6 MG/DL (ref 0.6–1)
EOSINOPHIL NFR BLD: 4.1 % (ref 0–3)
ERYTHROCYTE [DISTWIDTH] IN BLOOD BY AUTOMATED COUNT: 21.1 % (ref 10.5–14.5)
GLUCOSE SERPL-MCNC: 146 MG/DL (ref 74–106)
GRANULOCYTES NFR BLD MANUAL: 51.6 % (ref 36–66)
HCT VFR BLD CALC: 27.4 % (ref 37–47)
HGB BLD-MCNC: 8.6 GM/DL (ref 12–15)
LYMPHOCYTES NFR BLD AUTO: 35.7 % (ref 24–44)
MAGNESIUM SERPL-MCNC: 2 MG/DL (ref 1.8–2.4)
MCH RBC QN AUTO: 24.9 PG (ref 26–34)
MCHC RBC AUTO-ENTMCNC: 31.3 G/DL (ref 28–37)
MCV RBC: 79.6 FL (ref 80–100)
MONOCYTES NFR BLD: 7.7 % (ref 1–8)
NEUTROPHILS # BLD: 3.5 THOU/UL (ref 1.4–8.2)
PLATELET # BLD: 256 THOU/UL (ref 150–400)
POTASSIUM SERPL-SCNC: 3.4 MMOL/L (ref 3.5–5.1)
RBC # BLD AUTO: 3.45 MIL/UL (ref 4.2–5)
SODIUM SERPL-SCNC: 139 MMOL/L (ref 136–145)
WBC # BLD AUTO: 6.8 THOU/UL (ref 4–11)

## 2021-05-19 NOTE — EKG
[address]
[place]  [address]
Phone:  [phone]                    ELECTROCARDIOGRAM REPORT      
_______________________________________________________________________________
 
Name:       ISIS LOW          Room #:         359-P       ADM IN  
M.R.#:      [id number]     Account #:      [id number]  
Admission:  21    Attend Phys:    Mona Rodrigues MD        
Discharge:              Date of Birth:  [birth year]  
                                                          Report #: [id number]
   [id number]
_______________________________________________________________________________
                         [facility] ED
                                       
Test Date:    2021               Test Time:    15:11:01
Pat Name:     ISIS LOW       Department:   
Patient ID:   [id number]            Room:         359
Gender:       F                        Technician:   
:          1945               Requested By: Reyes Lawrence
Order Number: [id number] MD:   Lazaro Cook
                                 Measurements
Intervals                              Axis          
Rate:         114                      P:            15
AL:           156                      QRS:          5
QRSD:         85                       T:            156
QT:           320                                    
QTc:          441                                    
                           Interpretive Statements
Sinus tachycardia
Probable left atrial enlargement
Abnormal T, consider ischemia, lateral leads
Compared to ECG 05/10/2021 04:36:29
T-wave abnormality now present
Possible ischemia now present
Sinus pause or arrest no longer present
Electronically Signed On 2021 8:42:12 CDT by Lazaro Cook
[email or web address]
 
 
 
 
 
 
 
 
 
 
 
 
 
 
 
 
 
 
  <ELECTRONICALLY SIGNED>
   By: Lazaro Cook MD, FAC    
  21     0842
D: 21                           _____________________________________
T: 21                           Lazaro Cook MD, [facility]      /EPI

## 2021-05-19 NOTE — NUR
PT RESTING IN BED, IN THE DARK. IVF INTACT. FAMILY AT BEDSIDE. PT HAS A FLAT
AFFECT, MONOTONE. PT COMPLIANT WITH HS MEDS AND SNACK. PT STATED SHE WILL CALL
FOR ASSISTANCE WITH AMBULATION.

## 2021-05-19 NOTE — NUR
PT IS PROGRESSING TOWARDS CARE, SBP HAS BEEN IN THE 'S TODAY. DENIES
ANY DIZZINESS AND CHEST PAIN. UP TO BATHRROM WITH 1 ASSIST. FALL PRECAUTIONS
IN PLACVE. WILL CONTINUE TO MONITOR

## 2021-05-19 NOTE — NUR
INITIAL ASSESSMENT:
ZAHIDA reviewed chart and spoke with nursing and attending physician. Pt was
admitted from home due to hypotension. Cardiology consulted. Pt was recently
discharged home from [facility] on 5/14 with [facility] .  ZAHIDA met with pt
at bedside. Introduced role of SW. Pt is alert/orientated x 4. Pt reports she
lives at home with her . Prior to admission, pt was independent with
ADLs. Pt states she has a cane to use if needed. Pt lives in a one-level
house. No hx of HH or post-acute placement. Pt's PCP is Dr. Lavinia Ann in
[place]. Pt confirms plan to return home and resume HH services when
discharged. ZAHIDA notified [facility]  liaison of pt's admission. HH
liaison to fax info to the HH office and will follow.  ZAHIDA is following to
assist as needed with discharge planning.

## 2021-05-19 NOTE — NUR
Arrived from ER around 2050. SBP in the 90's upon arrival on the floor. IV
fluids started. MN SBP in the 80's with MAP in the 60's. NP notified , IVF
increased from 75 ml/hr to 125 ml/hr. Pt. denies any symptoms but would like
to find out what's going on. Tolerating room air well and denies being short
of breath. Denies any pain , afebrile. Up with SBA to commode to void.

## 2021-05-20 VITALS — DIASTOLIC BLOOD PRESSURE: 68 MMHG | SYSTOLIC BLOOD PRESSURE: 129 MMHG

## 2021-05-20 VITALS — SYSTOLIC BLOOD PRESSURE: 137 MMHG | DIASTOLIC BLOOD PRESSURE: 81 MMHG

## 2021-05-20 VITALS — DIASTOLIC BLOOD PRESSURE: 79 MMHG | SYSTOLIC BLOOD PRESSURE: 137 MMHG

## 2021-05-20 VITALS — SYSTOLIC BLOOD PRESSURE: 113 MMHG | DIASTOLIC BLOOD PRESSURE: 52 MMHG

## 2021-05-20 LAB
ANION GAP SERPL CALC-SCNC: 6 MMOL/L (ref 7–16)
BUN SERPL-MCNC: 15 MG/DL (ref 7–18)
CALCIUM SERPL-MCNC: 8.7 MG/DL (ref 8.5–10.1)
CHLORIDE SERPL-SCNC: 108 MMOL/L (ref 98–107)
CO2 SERPL-SCNC: 25 MMOL/L (ref 21–32)
CREAT SERPL-MCNC: 1.2 MG/DL (ref 0.6–1)
ERYTHROCYTE [DISTWIDTH] IN BLOOD BY AUTOMATED COUNT: 22 % (ref 10.5–14.5)
EST. AVERAGE GLUCOSE BLD GHB EST-MCNC: 183 MG/DL
GLUCOSE SERPL-MCNC: 151 MG/DL (ref 74–106)
GLYCOHEMOGLOBIN (HGB A1C): 8 % (ref 4.8–5.6)
HCT VFR BLD CALC: 28.4 % (ref 37–47)
HGB BLD-MCNC: 8.8 GM/DL (ref 12–15)
IRON SERPL-MCNC: 76 UG/DL (ref 50–170)
MCH RBC QN AUTO: 25.1 PG (ref 26–34)
MCHC RBC AUTO-ENTMCNC: 31.2 G/DL (ref 28–37)
MCV RBC: 80.6 FL (ref 80–100)
PLATELET # BLD: 256 THOU/UL (ref 150–400)
POTASSIUM SERPL-SCNC: 3.7 MMOL/L (ref 3.5–5.1)
RBC # BLD AUTO: 3.52 MIL/UL (ref 4.2–5)
SAO2 % BLD FROM PO2: 33 % (ref 20–39)
SODIUM SERPL-SCNC: 139 MMOL/L (ref 136–145)
TIBC SERPL-MCNC: 233 UG/DL (ref 250–450)
WBC # BLD AUTO: 6.5 THOU/UL (ref 4–11)

## 2021-05-20 NOTE — NUR
SW reviewed chart and spoke with nursing and attending physician. Pt's
hemoglobin dropped. GI consulted today. Pt with positive stool occult. Pt may
need an EGD/colonoscopy. Plan is for pt to discharge home and resume 
services with [facility]  when medically stable. ZAHIDA is following to
assist as needed with discharge planning.

## 2021-05-20 NOTE — NUR
PT CARE TAKEN OVER THIS AM, ALERRT AND ORIENTED X4, DENIES ANY PAIN, NAUSEA
AND VOMITTING. CONTINUES TO BE ON ROOM AIR, NO SIGNS OF DISTRESS NOTED. UP TO
BATHROOM WITH 1 ASSIST. FALL PRECAUTIONS IN PLACE. WILL CONTINUE TO MONITOR

## 2021-05-21 VITALS — SYSTOLIC BLOOD PRESSURE: 121 MMHG | DIASTOLIC BLOOD PRESSURE: 60 MMHG

## 2021-05-21 VITALS — DIASTOLIC BLOOD PRESSURE: 68 MMHG | SYSTOLIC BLOOD PRESSURE: 135 MMHG

## 2021-05-21 VITALS — SYSTOLIC BLOOD PRESSURE: 135 MMHG | DIASTOLIC BLOOD PRESSURE: 68 MMHG

## 2021-05-21 LAB
ANION GAP SERPL CALC-SCNC: 11 MMOL/L (ref 7–16)
BUN SERPL-MCNC: 12 MG/DL (ref 7–18)
CALCIUM SERPL-MCNC: 8.5 MG/DL (ref 8.5–10.1)
CHLORIDE SERPL-SCNC: 111 MMOL/L (ref 98–107)
CO2 SERPL-SCNC: 23 MMOL/L (ref 21–32)
CREAT SERPL-MCNC: 1.2 MG/DL (ref 0.6–1)
ERYTHROCYTE [DISTWIDTH] IN BLOOD BY AUTOMATED COUNT: 22.4 % (ref 10.5–14.5)
GLUCOSE SERPL-MCNC: 121 MG/DL (ref 74–106)
HCT VFR BLD CALC: 25.6 % (ref 37–47)
HGB BLD-MCNC: 8.2 GM/DL (ref 12–15)
MCH RBC QN AUTO: 25.2 PG (ref 26–34)
MCHC RBC AUTO-ENTMCNC: 31.9 G/DL (ref 28–37)
MCV RBC: 79 FL (ref 80–100)
PLATELET # BLD: 251 THOU/UL (ref 150–400)
POTASSIUM SERPL-SCNC: 3.6 MMOL/L (ref 3.5–5.1)
RBC # BLD AUTO: 3.24 MIL/UL (ref 4.2–5)
SODIUM SERPL-SCNC: 145 MMOL/L (ref 136–145)
WBC # BLD AUTO: 6.3 THOU/UL (ref 4–11)

## 2021-05-21 NOTE — NUR
Pt. stated she didn't sleep  much , just can't sleep. Up with SBA to bathroom
to void. Denies any pain. BP has improved. Progressing towards discharge
goals.

## 2021-05-21 NOTE — NUR
PT EDUCATED ON DISCHARGE INSTRUCTION AND NEW MED INFOMATION. ALL BELONGINGS
PACKED. PT DENIES ANY NEEDS. WAITING ON PT RIDE TO GET HERE

## 2021-05-21 NOTE — NUR
DISCHARGE NOTE:
SW reviewed chart and spoke with nursing and attending physicain. Pt is
medically stable for discharge home today with  services. [facility]
 liaison met with pt at bedside earlier today. Pt agreeable with discharge
plan. ZAHIDA faxed finalized discharge orders/summary to [facility] .
Received confirmation. SW notified HH liaison of discharge orders. Contact
info for HH placed in pt's discharge summary. Pt's family to provide
transportation home. No additional SW needs identified at this time, but is
available to assist should needs arise.

## 2022-04-29 NOTE — NUR
CARE ASSUMED THIS AM, PT ALERT AND ORIENTED X4, DENIES ANY PAIN, NAUSEA AND
VOMITTING. PT ON ROOM AIR, NO SIGNS OF DISTRESS NOTED. ANTICIPATING FOR
DISCHARGE TODAY. FALL PRECAUTIONS IN PLACE. DENIES ANY NEEDS NINA, WILL
CONTINUE TO MONITOR. [employer] Counseling:  I discussed with the patient the risks of mycophenolate mofetil including but not limited to infection/immunosuppression, GI upset, hypokalemia, hypercholesterolemia, bone marrow suppression, lymphoproliferative disorders, malignancy, GI ulceration/bleed/perforation, colitis, interstitial lung disease, kidney failure, progressive multifocal leukoencephalopathy, and birth defects.  The patient understands that monitoring is required including a baseline creatinine and regular CBC testing. In addition, patient must alert us immediately if symptoms of infection or other concerning signs are noted.